# Patient Record
Sex: MALE | Race: WHITE | NOT HISPANIC OR LATINO | Employment: FULL TIME | URBAN - METROPOLITAN AREA
[De-identification: names, ages, dates, MRNs, and addresses within clinical notes are randomized per-mention and may not be internally consistent; named-entity substitution may affect disease eponyms.]

---

## 2017-01-12 ENCOUNTER — HOSPITAL ENCOUNTER (OUTPATIENT)
Dept: GENERAL RADIOLOGY | Facility: HOSPITAL | Age: 52
Discharge: HOME | End: 2017-01-12
Attending: FAMILY MEDICINE | Admitting: FAMILY MEDICINE

## 2017-01-12 ENCOUNTER — TRANSCRIBE ORDERS (OUTPATIENT)
Dept: ADMINISTRATIVE | Facility: HOSPITAL | Age: 52
End: 2017-01-12

## 2017-01-12 DIAGNOSIS — S60.00XA CONTUSION OF FINGER WITHOUT DAMAGE TO NAIL, UNSPECIFIED FINGER, INITIAL ENCOUNTER: Primary | ICD-10-CM

## 2017-01-12 PROCEDURE — 73140 X-RAY EXAM OF FINGER(S): CPT

## 2020-03-09 ENCOUNTER — HOSPITAL ENCOUNTER (OUTPATIENT)
Dept: GENERAL RADIOLOGY | Facility: HOSPITAL | Age: 55
Discharge: HOME | End: 2020-03-09
Admitting: FAMILY MEDICINE

## 2020-03-09 ENCOUNTER — TRANSCRIBE ORDERS (OUTPATIENT)
Dept: ADMINISTRATIVE | Facility: HOSPITAL | Age: 55
End: 2020-03-09

## 2020-03-09 DIAGNOSIS — J20.9 ACUTE BRONCHITIS, UNSPECIFIED ORGANISM: Primary | ICD-10-CM

## 2020-03-09 PROCEDURE — 71046 X-RAY EXAM CHEST 2 VIEWS: CPT

## 2021-07-08 ENCOUNTER — HOSPITAL ENCOUNTER (OUTPATIENT)
Dept: GENERAL RADIOLOGY | Facility: HOSPITAL | Age: 56
Discharge: HOME | End: 2021-07-08
Admitting: FAMILY MEDICINE

## 2021-07-08 ENCOUNTER — TRANSCRIBE ORDERS (OUTPATIENT)
Dept: ADMINISTRATIVE | Facility: HOSPITAL | Age: 56
End: 2021-07-08

## 2021-07-08 DIAGNOSIS — R10.9 ABDOMINAL PAIN, ACUTE: Primary | ICD-10-CM

## 2021-07-08 PROCEDURE — 74018 RADEX ABDOMEN 1 VIEW: CPT

## 2023-01-18 ENCOUNTER — OFFICE VISIT (OUTPATIENT)
Dept: SLEEP MEDICINE | Facility: HOSPITAL | Age: 58
End: 2023-01-18
Payer: COMMERCIAL

## 2023-01-18 VITALS
OXYGEN SATURATION: 97 % | WEIGHT: 225 LBS | HEIGHT: 72 IN | SYSTOLIC BLOOD PRESSURE: 137 MMHG | DIASTOLIC BLOOD PRESSURE: 77 MMHG | HEART RATE: 66 BPM | BODY MASS INDEX: 30.48 KG/M2

## 2023-01-18 DIAGNOSIS — R06.83 SNORING: ICD-10-CM

## 2023-01-18 DIAGNOSIS — E66.9 OBESITY (BMI 30-39.9): ICD-10-CM

## 2023-01-18 DIAGNOSIS — G47.19 EXCESSIVE DAYTIME SLEEPINESS: Primary | ICD-10-CM

## 2023-01-18 DIAGNOSIS — G47.33 OSA (OBSTRUCTIVE SLEEP APNEA): ICD-10-CM

## 2023-01-18 PROCEDURE — 99204 OFFICE O/P NEW MOD 45 MIN: CPT | Performed by: INTERNAL MEDICINE

## 2023-01-18 NOTE — PROGRESS NOTES
"  Todd Miller is a 57 y.o. male.   Chief Complaint   Patient presents with   • Sleeping Problem       HPI     57 y.o. male seen in consultation at the request of Edwin Che,* for evaluation of the above.     He was diagnosed with obstructive sleep apnea in the past.  I do not have any specific records in this regard.  He says he may have seen me in the past but reviewing our records dating back to 2016 I can find no documentation of it in that timeframe.    He has snoring and apneas.  He wakens frequently at night and feels unrestored by his sleep in the morning.    He awakens sweating and choking sometimes at night.    He has tried CPAP several times and is poorly tolerant of it.  He has tried several masks including nasal masks.  He brings his device with him today.  He has not used it since 2019 based upon the download.  Settings are 4-20 cm H2O.  His AHI was within normal range during the brief amount of times he used it back then.  Leak was acceptable.    Bradford Scale is: 15/24    The patient's relevant past medical, surgical, family, and social history reviewed and updated in Epic as appropriate.    Current medications are: No current outpatient medications on file..    Review of Systems    Review of Systems  ROS documented in patient questionnaire ×14 systems.  Reviewed with patient.  Otherwise negative except as noted in HPI.    Physical Exam    Blood pressure 137/77, pulse 66, height 182.9 cm (72\"), weight 102 kg (225 lb), SpO2 97 %. Body mass index is 30.52 kg/m².    Physical Exam  Vitals and nursing note reviewed.   Constitutional:       Appearance: Normal appearance. He is well-developed.   HENT:      Head: Normocephalic and atraumatic.      Nose: Nose normal.      Mouth/Throat:      Mouth: Mucous membranes are moist.      Pharynx: Oropharynx is clear. No oropharyngeal exudate.      Comments: Class IV airway  Eyes:      General: No scleral icterus.     Conjunctiva/sclera: Conjunctivae " normal.   Neck:      Thyroid: No thyromegaly.      Trachea: No tracheal deviation.   Cardiovascular:      Rate and Rhythm: Normal rate and regular rhythm.      Heart sounds: No murmur heard.    No friction rub. No gallop.   Pulmonary:      Effort: Pulmonary effort is normal. No respiratory distress.      Breath sounds: No wheezing or rales.   Musculoskeletal:         General: No deformity. Normal range of motion.   Skin:     General: Skin is warm and dry.      Findings: No rash.   Neurological:      Mental Status: He is alert and oriented to person, place, and time.   Psychiatric:         Behavior: Behavior normal.         Thought Content: Thought content normal.         DATA:    Reviewed 10/26 note from Dr. Che    Reviewed machine download as described above    ASSESSMENT:    Problem List Items Addressed This Visit        Pulmonary Problems    ESTEFANIA (obstructive sleep apnea)    Relevant Orders    Home Sleep Study    Ambulatory Referral to Dentistry (Completed)    Snoring       Other    Excessive daytime sleepiness - Primary    Relevant Orders    Home Sleep Study    Obesity (BMI 30-39.9)       57-year-old male with a prior diagnosis of ESTEFANIA though I do not have specific records or testing data on this.  He has been intolerant of CPAP therapy and he does have in his possession a good quality auto CPAP machine with settings of 4-20 cm H2O.  He has tried several masks including nasal masks.  He has not used his CPAP in several years.  He has snoring, frequent awakenings, and daytime somnolence.    PLAN:    1. We discussed alternatives to CPAP therapy including an oral appliance, surgical options, and the inspire device  2. He was interested in getting a consultation regarding an oral appliance and I will make that referral to Dr. Baker  3. I will perform a home sleep apnea test to confirm his diagnosis and to establish a severity in case he wishes another referral for something more invasive.  If we are able to  get a recent study then we can forego this step.  4. In the meantime he is going to try using his CPAP machine again and if I have a sleep study I can order him new supplies.    I have reviewed the results of my evaluation and impression and discussed my recommendations in detail with the patient.    Level of Risk Moderate due to: undiagnosed new problem    Moderate risk of morbidity due to the possibility of untreated sleep apnea.    Signed by  Francis Wright MD    January 18, 2023      CC: LINDA Hitchcock MD Gerhardstein, Joseph E,*

## 2023-03-23 ENCOUNTER — HOSPITAL ENCOUNTER (OUTPATIENT)
Dept: SLEEP MEDICINE | Facility: HOSPITAL | Age: 58
Discharge: HOME | End: 2023-03-23
Admitting: INTERNAL MEDICINE
Payer: COMMERCIAL

## 2023-03-23 VITALS — WEIGHT: 225 LBS | BODY MASS INDEX: 30.48 KG/M2 | HEIGHT: 72 IN

## 2023-03-23 DIAGNOSIS — G47.33 OSA (OBSTRUCTIVE SLEEP APNEA): ICD-10-CM

## 2023-03-23 DIAGNOSIS — G47.19 EXCESSIVE DAYTIME SLEEPINESS: ICD-10-CM

## 2023-03-23 PROCEDURE — 95800 SLP STDY UNATTENDED: CPT

## 2023-03-24 DIAGNOSIS — G47.33 OSA (OBSTRUCTIVE SLEEP APNEA): Primary | ICD-10-CM

## 2023-03-24 PROCEDURE — 95800 SLP STDY UNATTENDED: CPT | Performed by: INTERNAL MEDICINE

## 2023-03-27 DIAGNOSIS — G47.33 OSA (OBSTRUCTIVE SLEEP APNEA): Primary | ICD-10-CM

## 2023-03-27 DIAGNOSIS — R06.83 SNORING: ICD-10-CM

## 2023-03-31 NOTE — PROGRESS NOTES
CALLED PATIENT TO ADVISE OF STUDY. REACHED VM AND LEFT MESSAGE REQUESTING RETURN CALL 03/31/23 TRC

## 2023-04-11 ENCOUNTER — HOSPITAL ENCOUNTER (OUTPATIENT)
Dept: GENERAL RADIOLOGY | Facility: HOSPITAL | Age: 58
Discharge: HOME | End: 2023-04-11
Admitting: FAMILY MEDICINE
Payer: COMMERCIAL

## 2023-04-11 ENCOUNTER — TRANSCRIBE ORDERS (OUTPATIENT)
Dept: ADMINISTRATIVE | Facility: HOSPITAL | Age: 58
End: 2023-04-11
Payer: COMMERCIAL

## 2023-04-11 DIAGNOSIS — J20.9 ACUTE BRONCHITIS, UNSPECIFIED ORGANISM: Primary | ICD-10-CM

## 2023-04-11 DIAGNOSIS — J20.9 ACUTE BRONCHITIS, UNSPECIFIED ORGANISM: ICD-10-CM

## 2023-04-11 PROCEDURE — 71046 X-RAY EXAM CHEST 2 VIEWS: CPT

## 2024-08-21 ENCOUNTER — HOSPITAL ENCOUNTER (EMERGENCY)
Facility: HOSPITAL | Age: 59
Discharge: OTHER INSTITUTION | End: 2024-08-21
Attending: EMERGENCY MEDICINE
Payer: COMMERCIAL

## 2024-08-21 VITALS
TEMPERATURE: 98.5 F | RESPIRATION RATE: 16 BRPM | DIASTOLIC BLOOD PRESSURE: 105 MMHG | WEIGHT: 215 LBS | OXYGEN SATURATION: 94 % | HEART RATE: 80 BPM | BODY MASS INDEX: 29.12 KG/M2 | HEIGHT: 72 IN | SYSTOLIC BLOOD PRESSURE: 171 MMHG

## 2024-08-21 DIAGNOSIS — H53.9 VISUAL CHANGES: Primary | ICD-10-CM

## 2024-08-21 DIAGNOSIS — H33.21 RIGHT RETINAL DETACHMENT: ICD-10-CM

## 2024-08-21 PROCEDURE — 99285 EMERGENCY DEPT VISIT HI MDM: CPT

## 2024-08-22 NOTE — ED PROVIDER NOTES
Subjective   History of Present Illness  Patient presents for evaluation of abrupt onset visual changes in the right eye.  Described as floaters and then a web like appearance of darkening of his vision.  This started this evening.  No similar symptoms.  No trauma to the eye.    History provided by:  Patient      Review of Systems    No past medical history on file.    Allergies   Allergen Reactions    Sulfa Antibiotics Anaphylaxis, Nausea Only and Other (See Comments)       Past Surgical History:   Procedure Laterality Date    TONSILLECTOMY         Family History   Problem Relation Age of Onset    Sleep apnea Father     Sleep apnea Brother     Stroke Brother     Heart disease Brother     Diabetes Brother     Diabetes Maternal Grandfather     Obesity Paternal Grandmother     Diabetes Paternal Grandmother     Heart disease Paternal Grandfather        Social History     Socioeconomic History    Marital status:    Tobacco Use    Smoking status: Never   Substance and Sexual Activity    Alcohol use: Never    Drug use: Never           Objective   Physical Exam  Constitutional:       General: He is not in acute distress.  HENT:      Head: Normocephalic and atraumatic.   Eyes:      General: No scleral icterus.        Right eye: No discharge.         Left eye: No discharge.      Extraocular Movements: Extraocular movements intact.      Conjunctiva/sclera: Conjunctivae normal.      Pupils: Pupils are equal, round, and reactive to light.   Cardiovascular:      Rate and Rhythm: Normal rate and regular rhythm.      Pulses: Normal pulses.      Heart sounds: No murmur heard.     No gallop.   Pulmonary:      Effort: Pulmonary effort is normal. No respiratory distress.   Abdominal:      General: Abdomen is flat. There is no distension.      Tenderness: There is no abdominal tenderness.   Musculoskeletal:         General: No swelling or deformity. Normal range of motion.   Skin:     General: Skin is warm and dry.       Capillary Refill: Capillary refill takes less than 2 seconds.   Neurological:      General: No focal deficit present.      Mental Status: He is alert and oriented to person, place, and time.   Psychiatric:         Mood and Affect: Mood normal.         Behavior: Behavior normal.         Procedures           ED Course                                             Medical Decision Making  Patient has a normal appearance of the external eye, ocular pressures are 10 on the left and 11 on the right.  No change in visual acuity.  Bedside ultrasound was conducted which demonstrates hyperechoic area in the posterior eye concerning for either retinal detachment or vitreous hemorrhage or detachment.  Consulted with on-call ophthalmologist at [facility] who accepts the patient as a transfer for further evaluation.    Problems Addressed:  Right retinal detachment: acute illness or injury  Visual changes: acute illness or injury    Amount and/or Complexity of Data Reviewed  Discussion of management or test interpretation with external provider(s): Consulted with on-call ophthalmologist at         Final diagnoses:   Visual changes   Right retinal detachment       ED Disposition  ED Disposition       ED Disposition   Transfer to Another Facility     Condition   --    Comment   --           No results found for this or any previous visit (from the past 24 hour(s)).  Note: In addition to lab results from this visit, the labs listed above may include labs taken at another facility or during a different encounter within the last 24 hours. Please correlate lab times with ED admission and discharge times for further clarification of the services performed during this visit.    No orders to display     Vitals:    08/21/24 2233   BP: (!) 211/104   BP Location: Right arm   Patient Position: Sitting   Pulse: 83   Resp: 16   Temp: 98.3 °F (36.8 °C)   TempSrc: Oral   SpO2: 95%   Weight: 97.5 kg (215 lb)   Height: 182.9 cm  "(72\")     Medications - No data to display  ECG/EMG Results (last 24 hours)       ** No results found for the last 24 hours. **          No orders to display           No follow-up provider specified.       Medication List      No changes were made to your prescriptions during this visit.            Edgardo Davila MD  08/21/24 6705    "

## 2024-08-22 NOTE — DISCHARGE INSTRUCTIONS
Go directly the to [facility] Emergency Room for further evaluation and treatment.